# Patient Record
Sex: FEMALE | Race: WHITE | NOT HISPANIC OR LATINO
[De-identification: names, ages, dates, MRNs, and addresses within clinical notes are randomized per-mention and may not be internally consistent; named-entity substitution may affect disease eponyms.]

---

## 2018-10-23 ENCOUNTER — OTHER (OUTPATIENT)
Age: 28
End: 2018-10-23

## 2018-10-23 PROBLEM — Z00.00 ENCOUNTER FOR PREVENTIVE HEALTH EXAMINATION: Status: ACTIVE | Noted: 2018-10-23

## 2018-12-13 ENCOUNTER — OTHER (OUTPATIENT)
Age: 28
End: 2018-12-13

## 2018-12-13 DIAGNOSIS — Z00.5 ENCOUNTER FOR EXAMINATION OF POTENTIAL DONOR OF ORGAN AND TISSUE: ICD-10-CM

## 2018-12-13 LAB — ABO + RH PNL BLD: NORMAL

## 2018-12-27 ENCOUNTER — APPOINTMENT (OUTPATIENT)
Dept: TRANSPLANT | Facility: CLINIC | Age: 28
End: 2018-12-27

## 2019-01-08 ENCOUNTER — OTHER (OUTPATIENT)
Age: 29
End: 2019-01-08

## 2020-10-15 ENCOUNTER — TRANSCRIPTION ENCOUNTER (OUTPATIENT)
Age: 30
End: 2020-10-15

## 2021-01-28 ENCOUNTER — TRANSCRIPTION ENCOUNTER (OUTPATIENT)
Age: 31
End: 2021-01-28

## 2021-04-13 ENCOUNTER — TRANSCRIPTION ENCOUNTER (OUTPATIENT)
Age: 31
End: 2021-04-13

## 2021-05-01 ENCOUNTER — TRANSCRIPTION ENCOUNTER (OUTPATIENT)
Age: 31
End: 2021-05-01

## 2021-05-05 ENCOUNTER — APPOINTMENT (OUTPATIENT)
Dept: OTOLARYNGOLOGY | Facility: CLINIC | Age: 31
End: 2021-05-05
Payer: COMMERCIAL

## 2021-05-05 VITALS
TEMPERATURE: 97.7 F | HEART RATE: 97 BPM | WEIGHT: 150 LBS | DIASTOLIC BLOOD PRESSURE: 74 MMHG | SYSTOLIC BLOOD PRESSURE: 113 MMHG | BODY MASS INDEX: 29.45 KG/M2 | HEIGHT: 60 IN

## 2021-05-05 DIAGNOSIS — Z78.9 OTHER SPECIFIED HEALTH STATUS: ICD-10-CM

## 2021-05-05 PROCEDURE — 99072 ADDL SUPL MATRL&STAF TM PHE: CPT

## 2021-05-05 PROCEDURE — 99204 OFFICE O/P NEW MOD 45 MIN: CPT

## 2021-05-05 NOTE — ASSESSMENT
[FreeTextEntry1] : Patient with left fungal oe.  Fungal debris suctioned.  Recommended dry ear precautions and started on Vosol HC otic.  Follow up in one week.   \par \par RX changed to acetic acid drops since vosol hc not covered.

## 2021-05-05 NOTE — HISTORY OF PRESENT ILLNESS
[de-identified] : c/o left ear ache for past few weeks.  Was treated at [facility] -  treated with abx and ear drops and also had cerumen removed.  Now on amoxacillin. Now feels worse pain in left ear.  Also noted decreased hearing.    No drainage from ear.  No prior ear problems   No tubes.   No q tips.

## 2021-05-05 NOTE — REVIEW OF SYSTEMS
[Post Nasal Drip] : post nasal drip [Ear Pain] : ear pain [Ear Itch] : ear itch [Hearing Loss] : hearing loss [Recurrent Ear Infections] : recurrent ear infections [Lightheadedness] : lightheadedness [Sinus Pain] : sinus pain [Sinus Pressure] : sinus pressure [Sense Of Smell Problem] : sense of smell problem [Throat Pain] : throat pain [Swelling Neck] : swelling neck [Swelling Face] : face swelling [Negative] : Heme/Lymph [de-identified] : headache

## 2021-05-05 NOTE — PHYSICAL EXAM
[Midline] : trachea located in midline position [Normal] : no rashes [de-identified] : left fungal debris suctioned - 4 +; -- tm inflammed - right eac normal

## 2021-05-05 NOTE — REASON FOR VISIT
[Initial Consultation] : an initial consultation for [Ear Pain] : ear pain [Parent] : parent [Other: _____] : [unfilled]

## 2021-05-13 ENCOUNTER — APPOINTMENT (OUTPATIENT)
Dept: OTOLARYNGOLOGY | Facility: CLINIC | Age: 31
End: 2021-05-13
Payer: COMMERCIAL

## 2021-05-13 VITALS
WEIGHT: 150 LBS | HEART RATE: 97 BPM | SYSTOLIC BLOOD PRESSURE: 113 MMHG | BODY MASS INDEX: 29.45 KG/M2 | DIASTOLIC BLOOD PRESSURE: 74 MMHG | TEMPERATURE: 98.4 F | HEIGHT: 60 IN

## 2021-05-13 PROCEDURE — 99072 ADDL SUPL MATRL&STAF TM PHE: CPT

## 2021-05-13 PROCEDURE — 99213 OFFICE O/P EST LOW 20 MIN: CPT

## 2021-05-13 NOTE — PHYSICAL EXAM
[Midline] : trachea located in midline position [Normal] : no rashes [de-identified] : sl fungal debris in left eac  - suctioned and leah's powder instilled

## 2021-05-13 NOTE — REASON FOR VISIT
[Subsequent Evaluation] : a subsequent evaluation for [Parent] : parent [Other: _____] : [unfilled] [FreeTextEntry2] : f/u

## 2021-05-13 NOTE — ASSESSMENT
[FreeTextEntry1] : Patient with markedly improved left fungal OE.  Still some debris in eac - removed and leah's powder instilled - recommended dry ear precautions, and follow up in 2 weeks.  Can continue drops for one week.

## 2021-05-24 ENCOUNTER — APPOINTMENT (OUTPATIENT)
Dept: OTOLARYNGOLOGY | Facility: CLINIC | Age: 31
End: 2021-05-24
Payer: COMMERCIAL

## 2021-05-24 VITALS
DIASTOLIC BLOOD PRESSURE: 74 MMHG | SYSTOLIC BLOOD PRESSURE: 113 MMHG | HEART RATE: 97 BPM | BODY MASS INDEX: 29.45 KG/M2 | TEMPERATURE: 98.3 F | WEIGHT: 150 LBS | HEIGHT: 60 IN

## 2021-05-24 DIAGNOSIS — B36.9 SUPERFICIAL MYCOSIS, UNSPECIFIED: ICD-10-CM

## 2021-05-24 DIAGNOSIS — H92.02 OTALGIA, LEFT EAR: ICD-10-CM

## 2021-05-24 DIAGNOSIS — H60.392 OTHER INFECTIVE OTITIS EXTERNA, LEFT EAR: ICD-10-CM

## 2021-05-24 DIAGNOSIS — H62.42 SUPERFICIAL MYCOSIS, UNSPECIFIED: ICD-10-CM

## 2021-05-24 PROCEDURE — 99213 OFFICE O/P EST LOW 20 MIN: CPT

## 2021-05-24 PROCEDURE — 99072 ADDL SUPL MATRL&STAF TM PHE: CPT

## 2021-05-24 NOTE — REASON FOR VISIT
[Subsequent Evaluation] : a subsequent evaluation for [FreeTextEntry2] : f/u [Parent] : parent [Other: _____] : [unfilled]

## 2021-05-24 NOTE — PHYSICAL EXAM
[de-identified] : ears clear - 1 + cerumen removed left eac - leah's powder instilled  [Midline] : trachea located in midline position [Normal] : no rashes

## 2021-05-24 NOTE — ASSESSMENT
[FreeTextEntry1] : Patient with resolved left fungal oe.  Recommended dry ear precautions for 2 more weeks and follow up as necessary

## 2021-06-29 ENCOUNTER — NON-APPOINTMENT (OUTPATIENT)
Age: 31
End: 2021-06-29

## 2021-06-29 ENCOUNTER — APPOINTMENT (OUTPATIENT)
Dept: DERMATOLOGY | Facility: CLINIC | Age: 31
End: 2021-06-29
Payer: COMMERCIAL

## 2021-06-29 DIAGNOSIS — L40.9 PSORIASIS, UNSPECIFIED: ICD-10-CM

## 2021-06-29 PROCEDURE — 99204 OFFICE O/P NEW MOD 45 MIN: CPT

## 2021-06-29 PROCEDURE — 99072 ADDL SUPL MATRL&STAF TM PHE: CPT

## 2021-06-29 RX ORDER — BETAMETHASONE DIPROPIONATE 0.5 MG/G
0.05 OINTMENT, AUGMENTED TOPICAL
Qty: 1 | Refills: 3 | Status: ACTIVE | COMMUNITY
Start: 2021-06-29 | End: 1900-01-01

## 2021-06-29 RX ORDER — NORETHINDRONE ACETATE AND ETHINYL ESTRADIOL 1.5-30(21)
1.5-3 KIT ORAL
Refills: 0 | Status: ACTIVE | COMMUNITY
Start: 2021-06-29

## 2021-06-29 RX ORDER — ACETIC ACID 20 MG/ML
2 SOLUTION AURICULAR (OTIC)
Qty: 1 | Refills: 2 | Status: COMPLETED | COMMUNITY
Start: 2021-05-05 | End: 2021-06-29

## 2021-06-29 RX ORDER — AMOXICILLIN 875 MG/1
TABLET, FILM COATED ORAL
Refills: 0 | Status: COMPLETED | COMMUNITY
End: 2021-06-29

## 2021-06-29 RX ORDER — HYDROCORTISONE AND ACETIC ACID OTIC 20.75; 10.375 MG/ML; MG/ML
1-2 SOLUTION AURICULAR (OTIC)
Qty: 1 | Refills: 2 | Status: COMPLETED | COMMUNITY
Start: 2021-05-05 | End: 2021-06-29

## 2021-06-29 NOTE — ASSESSMENT
[FreeTextEntry1] : Psoriasis; \par now with some guttate progression;  not severe;\par Therapeutic options and their risks and benefits; along with multiple diagnostic possibilities were discussed at length; risks and benefits of further study were discussed;\par \par betamethasone ointment BID x 2 weeks;  then prn;  \par f/u in Fall to recheck\par If continues to worsen, t/c starting UV tx through rest of summer;  \par Twin sister with Hx severe psoriasis\par \par also:  acne;  differin gel qHS; \par \par

## 2021-06-29 NOTE — PHYSICAL EXAM
[FreeTextEntry3] : Erythematous scaling plaques with adherent scale; \par + plaques on both elbows; \par + + guttate plaques scattered - few on UEs, LEs, abdomen

## 2021-08-25 ENCOUNTER — TRANSCRIPTION ENCOUNTER (OUTPATIENT)
Age: 31
End: 2021-08-25

## 2021-10-04 ENCOUNTER — APPOINTMENT (OUTPATIENT)
Dept: DERMATOLOGY | Facility: CLINIC | Age: 31
End: 2021-10-04

## 2021-12-05 ENCOUNTER — TRANSCRIPTION ENCOUNTER (OUTPATIENT)
Age: 31
End: 2021-12-05

## 2021-12-26 ENCOUNTER — TRANSCRIPTION ENCOUNTER (OUTPATIENT)
Age: 31
End: 2021-12-26

## 2022-06-13 NOTE — HISTORY OF PRESENT ILLNESS
[Adequate] : adequate [de-identified] : Pt. with hx of psoriasis;  long standing;  no treatments currently; \par has used topicals in distant past; \par

## 2022-06-20 ENCOUNTER — APPOINTMENT (OUTPATIENT)
Dept: DERMATOLOGY | Facility: CLINIC | Age: 32
End: 2022-06-20